# Patient Record
Sex: FEMALE | Race: WHITE | NOT HISPANIC OR LATINO
[De-identification: names, ages, dates, MRNs, and addresses within clinical notes are randomized per-mention and may not be internally consistent; named-entity substitution may affect disease eponyms.]

---

## 2018-10-02 ENCOUNTER — RESULT REVIEW (OUTPATIENT)
Age: 28
End: 2018-10-02

## 2019-12-06 ENCOUNTER — RESULT REVIEW (OUTPATIENT)
Age: 29
End: 2019-12-06

## 2019-12-19 ENCOUNTER — ASOB RESULT (OUTPATIENT)
Age: 29
End: 2019-12-19

## 2019-12-19 ENCOUNTER — APPOINTMENT (OUTPATIENT)
Dept: ANTEPARTUM | Facility: CLINIC | Age: 29
End: 2019-12-19
Payer: COMMERCIAL

## 2019-12-19 PROCEDURE — 76813 OB US NUCHAL MEAS 1 GEST: CPT

## 2019-12-19 PROCEDURE — 76801 OB US < 14 WKS SINGLE FETUS: CPT

## 2019-12-19 PROCEDURE — 36416 COLLJ CAPILLARY BLOOD SPEC: CPT

## 2020-02-12 PROBLEM — Z00.00 ENCOUNTER FOR PREVENTIVE HEALTH EXAMINATION: Status: ACTIVE | Noted: 2020-02-12

## 2020-02-13 ENCOUNTER — ASOB RESULT (OUTPATIENT)
Age: 30
End: 2020-02-13

## 2020-02-13 ENCOUNTER — APPOINTMENT (OUTPATIENT)
Dept: ANTEPARTUM | Facility: CLINIC | Age: 30
End: 2020-02-13
Payer: COMMERCIAL

## 2020-02-13 PROCEDURE — 76811 OB US DETAILED SNGL FETUS: CPT

## 2020-02-13 PROCEDURE — 76817 TRANSVAGINAL US OBSTETRIC: CPT | Mod: 59

## 2020-02-24 ENCOUNTER — ASOB RESULT (OUTPATIENT)
Age: 30
End: 2020-02-24

## 2020-02-24 ENCOUNTER — APPOINTMENT (OUTPATIENT)
Dept: ANTEPARTUM | Facility: CLINIC | Age: 30
End: 2020-02-24
Payer: COMMERCIAL

## 2020-02-24 PROCEDURE — 76816 OB US FOLLOW-UP PER FETUS: CPT

## 2020-04-26 ENCOUNTER — MESSAGE (OUTPATIENT)
Age: 30
End: 2020-04-26

## 2020-05-14 ENCOUNTER — APPOINTMENT (OUTPATIENT)
Dept: ANTEPARTUM | Facility: CLINIC | Age: 30
End: 2020-05-14
Payer: COMMERCIAL

## 2020-05-14 ENCOUNTER — ASOB RESULT (OUTPATIENT)
Age: 30
End: 2020-05-14

## 2020-05-14 PROCEDURE — 76816 OB US FOLLOW-UP PER FETUS: CPT

## 2020-05-14 PROCEDURE — 76819 FETAL BIOPHYS PROFIL W/O NST: CPT

## 2020-06-16 ENCOUNTER — ASOB RESULT (OUTPATIENT)
Age: 30
End: 2020-06-16

## 2020-06-16 ENCOUNTER — APPOINTMENT (OUTPATIENT)
Dept: ANTEPARTUM | Facility: CLINIC | Age: 30
End: 2020-06-16
Payer: COMMERCIAL

## 2020-06-16 PROCEDURE — 76819 FETAL BIOPHYS PROFIL W/O NST: CPT

## 2020-06-16 PROCEDURE — 76816 OB US FOLLOW-UP PER FETUS: CPT

## 2020-06-29 ENCOUNTER — APPOINTMENT (OUTPATIENT)
Dept: ANTEPARTUM | Facility: CLINIC | Age: 30
End: 2020-06-29
Payer: COMMERCIAL

## 2020-06-29 ENCOUNTER — ASOB RESULT (OUTPATIENT)
Age: 30
End: 2020-06-29

## 2020-06-29 PROCEDURE — 76816 OB US FOLLOW-UP PER FETUS: CPT

## 2020-06-29 PROCEDURE — 76819 FETAL BIOPHYS PROFIL W/O NST: CPT

## 2020-07-04 ENCOUNTER — TRANSCRIPTION ENCOUNTER (OUTPATIENT)
Age: 30
End: 2020-07-04

## 2020-07-05 ENCOUNTER — INPATIENT (INPATIENT)
Facility: HOSPITAL | Age: 30
LOS: 3 days | Discharge: HOME | End: 2020-07-09
Attending: OBSTETRICS & GYNECOLOGY | Admitting: OBSTETRICS & GYNECOLOGY

## 2020-07-05 VITALS
SYSTOLIC BLOOD PRESSURE: 128 MMHG | DIASTOLIC BLOOD PRESSURE: 76 MMHG | TEMPERATURE: 98 F | HEART RATE: 79 BPM | RESPIRATION RATE: 16 BRPM

## 2020-07-05 DIAGNOSIS — O26.899 OTHER SPECIFIED PREGNANCY RELATED CONDITIONS, UNSPECIFIED TRIMESTER: ICD-10-CM

## 2020-07-05 DIAGNOSIS — Z3A.00 WEEKS OF GESTATION OF PREGNANCY NOT SPECIFIED: ICD-10-CM

## 2020-07-05 LAB
BASOPHILS # BLD AUTO: 0.01 K/UL — SIGNIFICANT CHANGE UP (ref 0–0.2)
BASOPHILS NFR BLD AUTO: 0.1 % — SIGNIFICANT CHANGE UP (ref 0–2)
BLD GP AB SCN SERPL QL: NEGATIVE — SIGNIFICANT CHANGE UP
EOSINOPHIL # BLD AUTO: 0.01 K/UL — SIGNIFICANT CHANGE UP (ref 0–0.5)
EOSINOPHIL NFR BLD AUTO: 0.1 % — SIGNIFICANT CHANGE UP (ref 0–6)
HCT VFR BLD CALC: 34.5 % — SIGNIFICANT CHANGE UP (ref 34.5–45)
HGB BLD-MCNC: 11.2 G/DL — LOW (ref 11.5–15.5)
IMM GRANULOCYTES NFR BLD AUTO: 0.3 % — SIGNIFICANT CHANGE UP (ref 0–1.5)
LYMPHOCYTES # BLD AUTO: 1.16 K/UL — SIGNIFICANT CHANGE UP (ref 1–3.3)
LYMPHOCYTES # BLD AUTO: 15.8 % — SIGNIFICANT CHANGE UP (ref 13–44)
MCHC RBC-ENTMCNC: 30.9 PG — SIGNIFICANT CHANGE UP (ref 27–34)
MCHC RBC-ENTMCNC: 32.5 % — SIGNIFICANT CHANGE UP (ref 32–36)
MCV RBC AUTO: 95 FL — SIGNIFICANT CHANGE UP (ref 80–100)
MONOCYTES # BLD AUTO: 0.66 K/UL — SIGNIFICANT CHANGE UP (ref 0–0.9)
MONOCYTES NFR BLD AUTO: 9 % — SIGNIFICANT CHANGE UP (ref 2–14)
NEUTROPHILS # BLD AUTO: 5.47 K/UL — SIGNIFICANT CHANGE UP (ref 1.8–7.4)
NEUTROPHILS NFR BLD AUTO: 74.7 % — SIGNIFICANT CHANGE UP (ref 43–77)
NRBC # FLD: 0 K/UL — SIGNIFICANT CHANGE UP (ref 0–0)
PLATELET # BLD AUTO: 184 K/UL — SIGNIFICANT CHANGE UP (ref 150–400)
PMV BLD: 10.7 FL — SIGNIFICANT CHANGE UP (ref 7–13)
RBC # BLD: 3.63 M/UL — LOW (ref 3.8–5.2)
RBC # FLD: 12.5 % — SIGNIFICANT CHANGE UP (ref 10.3–14.5)
RH IG SCN BLD-IMP: POSITIVE — SIGNIFICANT CHANGE UP
RH IG SCN BLD-IMP: POSITIVE — SIGNIFICANT CHANGE UP
SARS-COV-2 RNA SPEC QL NAA+PROBE: SIGNIFICANT CHANGE UP
WBC # BLD: 7.33 K/UL — SIGNIFICANT CHANGE UP (ref 3.8–10.5)
WBC # FLD AUTO: 7.33 K/UL — SIGNIFICANT CHANGE UP (ref 3.8–10.5)

## 2020-07-05 RX ORDER — SODIUM CHLORIDE 9 MG/ML
1000 INJECTION, SOLUTION INTRAVENOUS
Refills: 0 | Status: DISCONTINUED | OUTPATIENT
Start: 2020-07-05 | End: 2020-07-05

## 2020-07-05 RX ORDER — OXYTOCIN 10 UNIT/ML
333.33 VIAL (ML) INJECTION
Qty: 20 | Refills: 0 | Status: DISCONTINUED | OUTPATIENT
Start: 2020-07-05 | End: 2020-07-05

## 2020-07-05 RX ORDER — ONDANSETRON 8 MG/1
4 TABLET, FILM COATED ORAL EVERY 6 HOURS
Refills: 0 | Status: DISCONTINUED | OUTPATIENT
Start: 2020-07-05 | End: 2020-07-06

## 2020-07-05 RX ORDER — SIMETHICONE 80 MG/1
80 TABLET, CHEWABLE ORAL EVERY 4 HOURS
Refills: 0 | Status: DISCONTINUED | OUTPATIENT
Start: 2020-07-05 | End: 2020-07-09

## 2020-07-05 RX ORDER — FAMOTIDINE 10 MG/ML
20 INJECTION INTRAVENOUS ONCE
Refills: 0 | Status: DISCONTINUED | OUTPATIENT
Start: 2020-07-05 | End: 2020-07-05

## 2020-07-05 RX ORDER — METOCLOPRAMIDE HCL 10 MG
10 TABLET ORAL ONCE
Refills: 0 | Status: COMPLETED | OUTPATIENT
Start: 2020-07-05 | End: 2020-07-05

## 2020-07-05 RX ORDER — SODIUM CHLORIDE 9 MG/ML
1000 INJECTION, SOLUTION INTRAVENOUS ONCE
Refills: 0 | Status: DISCONTINUED | OUTPATIENT
Start: 2020-07-05 | End: 2020-07-05

## 2020-07-05 RX ORDER — DIPHENHYDRAMINE HCL 50 MG
25 CAPSULE ORAL EVERY 6 HOURS
Refills: 0 | Status: DISCONTINUED | OUTPATIENT
Start: 2020-07-05 | End: 2020-07-09

## 2020-07-05 RX ORDER — CEFAZOLIN SODIUM 1 G
2000 VIAL (EA) INJECTION ONCE
Refills: 0 | Status: COMPLETED | OUTPATIENT
Start: 2020-07-05 | End: 2020-07-05

## 2020-07-05 RX ORDER — OXYCODONE HYDROCHLORIDE 5 MG/1
5 TABLET ORAL
Refills: 0 | Status: DISCONTINUED | OUTPATIENT
Start: 2020-07-05 | End: 2020-07-09

## 2020-07-05 RX ORDER — LANOLIN
1 OINTMENT (GRAM) TOPICAL EVERY 6 HOURS
Refills: 0 | Status: DISCONTINUED | OUTPATIENT
Start: 2020-07-05 | End: 2020-07-09

## 2020-07-05 RX ORDER — HEPARIN SODIUM 5000 [USP'U]/ML
5000 INJECTION INTRAVENOUS; SUBCUTANEOUS EVERY 12 HOURS
Refills: 0 | Status: DISCONTINUED | OUTPATIENT
Start: 2020-07-05 | End: 2020-07-09

## 2020-07-05 RX ORDER — IBUPROFEN 200 MG
600 TABLET ORAL EVERY 6 HOURS
Refills: 0 | Status: COMPLETED | OUTPATIENT
Start: 2020-07-05 | End: 2021-06-03

## 2020-07-05 RX ORDER — OXYCODONE HYDROCHLORIDE 5 MG/1
5 TABLET ORAL
Refills: 0 | Status: DISCONTINUED | OUTPATIENT
Start: 2020-07-05 | End: 2020-07-06

## 2020-07-05 RX ORDER — FAMOTIDINE 10 MG/ML
20 INJECTION INTRAVENOUS ONCE
Refills: 0 | Status: COMPLETED | OUTPATIENT
Start: 2020-07-05 | End: 2020-07-05

## 2020-07-05 RX ORDER — NALOXONE HYDROCHLORIDE 4 MG/.1ML
0.1 SPRAY NASAL
Refills: 0 | Status: DISCONTINUED | OUTPATIENT
Start: 2020-07-05 | End: 2020-07-06

## 2020-07-05 RX ORDER — CITRIC ACID/SODIUM CITRATE 300-500 MG
30 SOLUTION, ORAL ORAL ONCE
Refills: 0 | Status: COMPLETED | OUTPATIENT
Start: 2020-07-05 | End: 2020-07-05

## 2020-07-05 RX ORDER — OXYCODONE HYDROCHLORIDE 5 MG/1
10 TABLET ORAL
Refills: 0 | Status: DISCONTINUED | OUTPATIENT
Start: 2020-07-05 | End: 2020-07-06

## 2020-07-05 RX ORDER — OXYCODONE HYDROCHLORIDE 5 MG/1
5 TABLET ORAL ONCE
Refills: 0 | Status: DISCONTINUED | OUTPATIENT
Start: 2020-07-05 | End: 2020-07-09

## 2020-07-05 RX ORDER — ACETAMINOPHEN 500 MG
975 TABLET ORAL
Refills: 0 | Status: DISCONTINUED | OUTPATIENT
Start: 2020-07-05 | End: 2020-07-09

## 2020-07-05 RX ORDER — TETANUS TOXOID, REDUCED DIPHTHERIA TOXOID AND ACELLULAR PERTUSSIS VACCINE, ADSORBED 5; 2.5; 8; 8; 2.5 [IU]/.5ML; [IU]/.5ML; UG/.5ML; UG/.5ML; UG/.5ML
0.5 SUSPENSION INTRAMUSCULAR ONCE
Refills: 0 | Status: DISCONTINUED | OUTPATIENT
Start: 2020-07-05 | End: 2020-07-09

## 2020-07-05 RX ORDER — FERROUS SULFATE 325(65) MG
325 TABLET ORAL DAILY
Refills: 0 | Status: DISCONTINUED | OUTPATIENT
Start: 2020-07-05 | End: 2020-07-09

## 2020-07-05 RX ORDER — HYDROMORPHONE HYDROCHLORIDE 2 MG/ML
0.5 INJECTION INTRAMUSCULAR; INTRAVENOUS; SUBCUTANEOUS
Refills: 0 | Status: DISCONTINUED | OUTPATIENT
Start: 2020-07-05 | End: 2020-07-06

## 2020-07-05 RX ORDER — KETOROLAC TROMETHAMINE 30 MG/ML
30 SYRINGE (ML) INJECTION EVERY 6 HOURS
Refills: 0 | Status: DISCONTINUED | OUTPATIENT
Start: 2020-07-05 | End: 2020-07-06

## 2020-07-05 RX ORDER — MAGNESIUM HYDROXIDE 400 MG/1
30 TABLET, CHEWABLE ORAL
Refills: 0 | Status: DISCONTINUED | OUTPATIENT
Start: 2020-07-05 | End: 2020-07-09

## 2020-07-05 RX ORDER — METOCLOPRAMIDE HCL 10 MG
10 TABLET ORAL ONCE
Refills: 0 | Status: DISCONTINUED | OUTPATIENT
Start: 2020-07-05 | End: 2020-07-05

## 2020-07-05 RX ORDER — METOCLOPRAMIDE HCL 10 MG
5 TABLET ORAL EVERY 6 HOURS
Refills: 0 | Status: COMPLETED | OUTPATIENT
Start: 2020-07-05 | End: 2020-07-06

## 2020-07-05 RX ORDER — CITRIC ACID/SODIUM CITRATE 300-500 MG
30 SOLUTION, ORAL ORAL ONCE
Refills: 0 | Status: DISCONTINUED | OUTPATIENT
Start: 2020-07-05 | End: 2020-07-05

## 2020-07-05 RX ADMIN — SODIUM CHLORIDE 2000 MILLILITER(S): 9 INJECTION, SOLUTION INTRAVENOUS at 11:30

## 2020-07-05 RX ADMIN — Medication 30 MILLIGRAM(S): at 23:05

## 2020-07-05 RX ADMIN — Medication 5 MILLIGRAM(S): at 23:05

## 2020-07-05 RX ADMIN — Medication 5 MILLIGRAM(S): at 16:43

## 2020-07-05 RX ADMIN — Medication 100 MILLIGRAM(S): at 16:42

## 2020-07-05 RX ADMIN — Medication 1000 MILLIUNIT(S)/MIN: at 16:42

## 2020-07-05 RX ADMIN — Medication 30 MILLIGRAM(S): at 17:07

## 2020-07-05 RX ADMIN — HEPARIN SODIUM 5000 UNIT(S): 5000 INJECTION INTRAVENOUS; SUBCUTANEOUS at 23:16

## 2020-07-05 RX ADMIN — Medication 975 MILLIGRAM(S): at 21:33

## 2020-07-05 RX ADMIN — Medication 30 MILLILITER(S): at 14:11

## 2020-07-05 RX ADMIN — SODIUM CHLORIDE 75 MILLILITER(S): 9 INJECTION, SOLUTION INTRAVENOUS at 19:13

## 2020-07-05 RX ADMIN — FAMOTIDINE 20 MILLIGRAM(S): 10 INJECTION INTRAVENOUS at 14:11

## 2020-07-05 RX ADMIN — Medication 10 MILLIGRAM(S): at 14:11

## 2020-07-05 RX ADMIN — Medication 25 MILLIGRAM(S): at 21:33

## 2020-07-05 NOTE — OB PROVIDER DELIVERY SUMMARY - NSPROVIDERDELIVERYNOTE_OBGYN_ALL_OB_FT
Male infant Apgars 8,9    UOP: 400  IVF: 2200    Grossly normal uterus, bilateral tubes and ovaries. Surgicel used on the hysterotomy. Male infant Apgars 8,9    UOP: 400  IVF: 2200    Grossly normal uterus, bilateral tubes and ovaries. Surgicel used on the hysterotomy. A small, anterofundal sub serosal fibroid was lysed using Bovie cautery

## 2020-07-05 NOTE — OB PROVIDER H&P - ATTENDING COMMENTS
Patient seen in holding area. Again, Risks and benefits of an elective  section Vs vaginal delivery in the setting of an LGA fetus and a borderline pelvis discussed and informed consents obtained.

## 2020-07-05 NOTE — OB PROVIDER TRIAGE NOTE - NSOBPROVIDERNOTE_OBGYN_ALL_OB_FT
29 year old female presented in labor  EFW 4300g LGA for Primary CS    case d/w DR Greenfield    labs sent   Covid 19 testing done      Bobby BLACKWOOD

## 2020-07-05 NOTE — OB PROVIDER TRIAGE NOTE - NSHPPHYSICALEXAM_GEN_ALL_CORE
pt seen and examined    ICU Vital Signs Last 24 Hrs  T(C): 36.6 (05 Jul 2020 11:15), Max: 36.9 (05 Jul 2020 10:20)  T(F): 97.9 (05 Jul 2020 11:15), Max: 98.4 (05 Jul 2020 10:20)  HR: 79 (05 Jul 2020 11:43) (70 - 87)  BP: 132/85 (05 Jul 2020 11:34) (122/80 - 132/85)  BP(mean): --  ABP: --  ABP(mean): --  RR: 18 (05 Jul 2020 11:15) (16 - 18)  SpO2: 100% (05 Jul 2020 11:38) (98% - 100%)  pt alert in nad   lungs c;ear    heart s1 s2  abd soft  gravid   ve l/c/p/-3   placed on EFM     scan vtx  efw  LGA  4300g

## 2020-07-05 NOTE — OB PROVIDER H&P - ASSESSMENT
29 year old female presented in labor  EFW 4300g LGA for Primary CS    case d/w DR Greenfield    labs sent   Covid 19 testing done      Bobby BLACKWOOD 29 year old female presented in labor  EFW 4300g LGA for Primary CS    case d/w DR Street    labs sent   Covid 19 testing done      Bobby BLACKWOOD

## 2020-07-05 NOTE — OB NEONATOLOGY/PEDIATRICIAN DELIVERY SUMMARY - NSPEDSNEONOTESA_OBGYN_ALL_OB_FT
Baby boy born at 40.2 wks via CS to a 28 y/o  B+ blood type mother. Maternal history of positive HSV1 IgG on prenatal labs, no history or medications disclosed. Prenatal history of macrosomia. PNL nr/immune/-, GBS - on . ROM at TOD clear fluids. Nuchal x 1. Baby emerged vigorous, crying, was w/d/s/s with APGARS of 8/9. Mom would like to breast feed, requests Hep B and consents circ. EOS 0.01 Baby boy born at 40.2 wks via CS to a 30 y/o  B+ blood type mother. Maternal history of positive HSV1 IgG on prenatal labs, no history or medications disclosed. Prenatal history of macrosomia. PNL nr/immune/-, GBS - on . ROM at TOD clear fluids. Nuchal x 2. Baby emerged vigorous, crying, was w/d/s/s with APGARS of 8/9. Mom would like to breast feed, requests Hep B and consents circ. EOS 0.01

## 2020-07-05 NOTE — OB PROVIDER TRIAGE NOTE - HISTORY OF PRESENT ILLNESS
29 year old female P0 at40.2 weeks 29 year old female P0 at 40.2 weeks who presents with contractions every 3-5 minutes this am and sted EFW LGA 4300g and scheduled for PCS on 7/62020    denied any GDM    denied any rom lof vb  feels GFM   denied any other ap issues denied any fetal issues     med hx denied  surghx denied  OB hx P0   GYn  hx denied  NKDA

## 2020-07-05 NOTE — OB RN DELIVERY SUMMARY - NS_SEPSISRSKCALC_OBGYN_ALL_OB_FT
EOS calculated successfully. EOS Risk Factor: 0.5/1000 live births ([facility] national incidence); GA=40w2d; Temp=98.4; ROM=0.017; GBS='Negative'; Antibiotics='No antibiotics or any antibiotics < 2 hrs prior to birth'

## 2020-07-05 NOTE — OB PROVIDER H&P - HISTORY OF PRESENT ILLNESS
29 year old female P0 at 40.2 weeks who presents with contractions every 3-5 minutes this am and sted EFW LGA 4300g and scheduled for PCS on 7/62020    denied any GDM    denied any rom lof vb  feels GFM   denied any other ap issues denied any fetal issues     med hx denied  surghx denied  OB hx P0   GYn  hx denied  NKDA

## 2020-07-06 LAB
APPEARANCE UR: SIGNIFICANT CHANGE UP
BACTERIA # UR AUTO: SIGNIFICANT CHANGE UP
BASOPHILS # BLD AUTO: 0.01 K/UL — SIGNIFICANT CHANGE UP (ref 0–0.2)
BASOPHILS NFR BLD AUTO: 0.1 % — SIGNIFICANT CHANGE UP (ref 0–2)
BILIRUB UR-MCNC: NEGATIVE — SIGNIFICANT CHANGE UP
BLOOD UR QL VISUAL: HIGH
COLOR SPEC: SIGNIFICANT CHANGE UP
CREAT SERPL-MCNC: 0.67 MG/DL — SIGNIFICANT CHANGE UP (ref 0.5–1.3)
EOSINOPHIL # BLD AUTO: 0.01 K/UL — SIGNIFICANT CHANGE UP (ref 0–0.5)
EOSINOPHIL NFR BLD AUTO: 0.1 % — SIGNIFICANT CHANGE UP (ref 0–6)
GLUCOSE UR-MCNC: NEGATIVE — SIGNIFICANT CHANGE UP
HCT VFR BLD CALC: 26.4 % — LOW (ref 34.5–45)
HCT VFR BLD CALC: 27.2 % — LOW (ref 34.5–45)
HGB BLD-MCNC: 8.7 G/DL — LOW (ref 11.5–15.5)
HGB BLD-MCNC: 9 G/DL — LOW (ref 11.5–15.5)
HYALINE CASTS # UR AUTO: SIGNIFICANT CHANGE UP
IMM GRANULOCYTES NFR BLD AUTO: 0.4 % — SIGNIFICANT CHANGE UP (ref 0–1.5)
KETONES UR-MCNC: NEGATIVE — SIGNIFICANT CHANGE UP
LACTATE SERPL-SCNC: 1.4 MMOL/L — SIGNIFICANT CHANGE UP (ref 0.5–2)
LACTATE SERPL-SCNC: 2.1 MMOL/L — HIGH (ref 0.5–2)
LEUKOCYTE ESTERASE UR-ACNC: SIGNIFICANT CHANGE UP
LYMPHOCYTES # BLD AUTO: 0.76 K/UL — LOW (ref 1–3.3)
LYMPHOCYTES # BLD AUTO: 9.3 % — LOW (ref 13–44)
MCHC RBC-ENTMCNC: 31.4 PG — SIGNIFICANT CHANGE UP (ref 27–34)
MCHC RBC-ENTMCNC: 31.8 PG — SIGNIFICANT CHANGE UP (ref 27–34)
MCHC RBC-ENTMCNC: 33 % — SIGNIFICANT CHANGE UP (ref 32–36)
MCHC RBC-ENTMCNC: 33.1 % — SIGNIFICANT CHANGE UP (ref 32–36)
MCV RBC AUTO: 95.3 FL — SIGNIFICANT CHANGE UP (ref 80–100)
MCV RBC AUTO: 96.1 FL — SIGNIFICANT CHANGE UP (ref 80–100)
MONOCYTES # BLD AUTO: 0.69 K/UL — SIGNIFICANT CHANGE UP (ref 0–0.9)
MONOCYTES NFR BLD AUTO: 8.5 % — SIGNIFICANT CHANGE UP (ref 2–14)
NEUTROPHILS # BLD AUTO: 6.65 K/UL — SIGNIFICANT CHANGE UP (ref 1.8–7.4)
NEUTROPHILS NFR BLD AUTO: 81.6 % — HIGH (ref 43–77)
NITRITE UR-MCNC: NEGATIVE — SIGNIFICANT CHANGE UP
NRBC # FLD: 0 K/UL — SIGNIFICANT CHANGE UP (ref 0–0)
NRBC # FLD: 0 K/UL — SIGNIFICANT CHANGE UP (ref 0–0)
PH UR: 7.5 — SIGNIFICANT CHANGE UP (ref 5–8)
PLATELET # BLD AUTO: 153 K/UL — SIGNIFICANT CHANGE UP (ref 150–400)
PLATELET # BLD AUTO: 166 K/UL — SIGNIFICANT CHANGE UP (ref 150–400)
PMV BLD: 10.6 FL — SIGNIFICANT CHANGE UP (ref 7–13)
PMV BLD: 10.8 FL — SIGNIFICANT CHANGE UP (ref 7–13)
PROT UR-MCNC: 100 — HIGH
RBC # BLD: 2.77 M/UL — LOW (ref 3.8–5.2)
RBC # BLD: 2.83 M/UL — LOW (ref 3.8–5.2)
RBC # FLD: 12.5 % — SIGNIFICANT CHANGE UP (ref 10.3–14.5)
RBC # FLD: 12.7 % — SIGNIFICANT CHANGE UP (ref 10.3–14.5)
RBC CASTS # UR COMP ASSIST: >50 — HIGH (ref 0–?)
SP GR SPEC: 1.01 — SIGNIFICANT CHANGE UP (ref 1–1.04)
SQUAMOUS # UR AUTO: SIGNIFICANT CHANGE UP
T PALLIDUM AB TITR SER: NEGATIVE — SIGNIFICANT CHANGE UP
UROBILINOGEN FLD QL: NORMAL — SIGNIFICANT CHANGE UP
WBC # BLD: 8.15 K/UL — SIGNIFICANT CHANGE UP (ref 3.8–10.5)
WBC # BLD: 9.03 K/UL — SIGNIFICANT CHANGE UP (ref 3.8–10.5)
WBC # FLD AUTO: 8.15 K/UL — SIGNIFICANT CHANGE UP (ref 3.8–10.5)
WBC # FLD AUTO: 9.03 K/UL — SIGNIFICANT CHANGE UP (ref 3.8–10.5)
WBC UR QL: HIGH (ref 0–?)

## 2020-07-06 RX ORDER — IBUPROFEN 200 MG
600 TABLET ORAL EVERY 6 HOURS
Refills: 0 | Status: DISCONTINUED | OUTPATIENT
Start: 2020-07-06 | End: 2020-07-09

## 2020-07-06 RX ORDER — ERTAPENEM SODIUM 1 G/1
INJECTION, POWDER, LYOPHILIZED, FOR SOLUTION INTRAMUSCULAR; INTRAVENOUS
Refills: 0 | Status: DISCONTINUED | OUTPATIENT
Start: 2020-07-06 | End: 2020-07-06

## 2020-07-06 RX ORDER — SODIUM CHLORIDE 9 MG/ML
1000 INJECTION, SOLUTION INTRAVENOUS
Refills: 0 | Status: DISCONTINUED | OUTPATIENT
Start: 2020-07-06 | End: 2020-07-07

## 2020-07-06 RX ORDER — ACETAMINOPHEN 500 MG
975 TABLET ORAL EVERY 6 HOURS
Refills: 0 | Status: DISCONTINUED | OUTPATIENT
Start: 2020-07-06 | End: 2020-07-06

## 2020-07-06 RX ORDER — SODIUM CHLORIDE 9 MG/ML
1000 INJECTION, SOLUTION INTRAVENOUS ONCE
Refills: 0 | Status: COMPLETED | OUTPATIENT
Start: 2020-07-06 | End: 2020-07-06

## 2020-07-06 RX ORDER — BETHANECHOL CHLORIDE 25 MG
5 TABLET ORAL THREE TIMES A DAY
Refills: 0 | Status: COMPLETED | OUTPATIENT
Start: 2020-07-06 | End: 2020-07-07

## 2020-07-06 RX ORDER — ERTAPENEM SODIUM 1 G/1
1000 INJECTION, POWDER, LYOPHILIZED, FOR SOLUTION INTRAMUSCULAR; INTRAVENOUS ONCE
Refills: 0 | Status: COMPLETED | OUTPATIENT
Start: 2020-07-06 | End: 2020-07-06

## 2020-07-06 RX ADMIN — SODIUM CHLORIDE 1000 MILLILITER(S): 9 INJECTION, SOLUTION INTRAVENOUS at 18:30

## 2020-07-06 RX ADMIN — Medication 5 MILLIGRAM(S): at 05:17

## 2020-07-06 RX ADMIN — Medication 30 MILLIGRAM(S): at 05:16

## 2020-07-06 RX ADMIN — Medication 30 MILLIGRAM(S): at 12:19

## 2020-07-06 RX ADMIN — Medication 975 MILLIGRAM(S): at 18:00

## 2020-07-06 RX ADMIN — Medication 5 MILLIGRAM(S): at 12:20

## 2020-07-06 RX ADMIN — Medication 5 MILLIGRAM(S): at 21:30

## 2020-07-06 RX ADMIN — HEPARIN SODIUM 5000 UNIT(S): 5000 INJECTION INTRAVENOUS; SUBCUTANEOUS at 11:00

## 2020-07-06 RX ADMIN — Medication 975 MILLIGRAM(S): at 03:00

## 2020-07-06 RX ADMIN — Medication 25 MILLIGRAM(S): at 05:26

## 2020-07-06 RX ADMIN — ERTAPENEM SODIUM 120 MILLIGRAM(S): 1 INJECTION, POWDER, LYOPHILIZED, FOR SOLUTION INTRAMUSCULAR; INTRAVENOUS at 19:00

## 2020-07-06 RX ADMIN — HEPARIN SODIUM 5000 UNIT(S): 5000 INJECTION INTRAVENOUS; SUBCUTANEOUS at 22:44

## 2020-07-06 RX ADMIN — Medication 5 MILLIGRAM(S): at 13:38

## 2020-07-06 NOTE — PROGRESS NOTE ADULT - SUBJECTIVE AND OBJECTIVE BOX
Patient seen and examined at bedside, no acute overnight events. Patients reports diffuse pruritis, pain well controlled. Patient is ambulating and tolerating regular diet. Has not yet passed flatus. Voiding spontaneously. No other complaints at this time.    Vital Signs Last 24 Hours  T(C): 37.2 (07-06-20 @ 05:55), Max: 37.2 (07-06-20 @ 05:55)  HR: 100 (07-06-20 @ 05:55) (60 - 100)  BP: 106/56 (07-06-20 @ 05:55) (90/69 - 136/85)  RR: 18 (07-06-20 @ 05:55) (11 - 22)  SpO2: 98% (07-06-20 @ 05:55) (98% - 100%)    I&O's Summary    05 Jul 2020 07:01  -  06 Jul 2020 07:00  --------------------------------------------------------  IN: 4325 mL / OUT: 2146 mL / NET: 2179 mL        Physical Exam:  General: NAD  Abdomen: Soft, non-tender, non-distended, fundus firm  Incision: Pfannenstiel incision CDI, subcuticular suture closure  Pelvic: Lochia wnl  Skin: No hives, no rash    Labs:    Blood Type: B Positive  Antibody Screen: Negative               9.0    8.15  )-----------( 153      ( 07-06 @ 06:04 )             27.2                11.2   7.33  )-----------( 184      ( 07-05 @ 11:15 )             34.5         MEDICATIONS  (STANDING):  acetaminophen   Tablet .. 975 milliGRAM(s) Oral <User Schedule>  diphtheria/tetanus/pertussis (acellular) Vaccine (ADAcel) 0.5 milliLiter(s) IntraMuscular once  ferrous    sulfate 325 milliGRAM(s) Oral daily  heparin   Injectable 5000 Unit(s) SubCutaneous every 12 hours  ibuprofen  Tablet. 600 milliGRAM(s) Oral every 6 hours  ketorolac   Injectable 30 milliGRAM(s) IV Push every 6 hours  metoclopramide Injectable 5 milliGRAM(s) IV Push every 6 hours  prenatal multivitamin 1 Tablet(s) Oral daily    MEDICATIONS  (PRN):  diphenhydrAMINE 25 milliGRAM(s) Oral every 6 hours PRN Itching  HYDROmorphone  Injectable 0.5 milliGRAM(s) IV Push every 2 hours PRN Severe Pain (7 - 10)  lanolin Ointment 1 Application(s) Topical every 6 hours PRN Sore Nipples  magnesium hydroxide Suspension 30 milliLiter(s) Oral two times a day PRN Constipation  naloxone Injectable 0.1 milliGRAM(s) IV Push every 3 minutes PRN For ANY of the following changes in patient status:  A. RR LESS THAN 10 breaths per minute, B. Oxygen saturation LESS THAN 90%, C. Sedation score of 6  ondansetron Injectable 4 milliGRAM(s) IV Push every 6 hours PRN Nausea  oxyCODONE    IR 5 milliGRAM(s) Oral every 3 hours PRN Mild Pain (1 - 3)  oxyCODONE    IR 10 milliGRAM(s) Oral every 3 hours PRN Moderate Pain (4 - 6)  oxyCODONE    IR 5 milliGRAM(s) Oral every 3 hours PRN Moderate to Severe Pain (4-10)  oxyCODONE    IR 5 milliGRAM(s) Oral once PRN Moderate to Severe Pain (4-10)  simethicone 80 milliGRAM(s) Chew every 4 hours PRN Gas

## 2020-07-06 NOTE — PROGRESS NOTE ADULT - SUBJECTIVE AND OBJECTIVE BOX
ARLEEN CALDERON is a  29y  year old  Female    Event :   Notified by RN pt with Temp 102.6    Vital Signs Last 24 Hrs  T(C): 39.2 (06 Jul 2020 17:45), Max: 39.2 (06 Jul 2020 17:45)  T(F): 102.6 (06 Jul 2020 17:45), Max: 102.6 (06 Jul 2020 17:45)  HR: 110 (06 Jul 2020 17:45) (60 - 110)  BP: 135/84 (06 Jul 2020 17:45) (106/56 - 135/84)  BP(mean): 72 (05 Jul 2020 19:00) (72 - 74)  RR: 18 (06 Jul 2020 17:45) (18 - 20)  SpO2: 100% (06 Jul 2020 17:45) (98% - 100%)    I&O's Detail    05 Jul 2020 07:01  -  06 Jul 2020 07:00  --------------------------------------------------------  IN:    lactated ringers.: 375 mL    lactated ringers.: 500 mL    Other: 2200 mL    oxytocin Infusion: 500 mL    oxytocin Infusion: 750 mL  Total IN: 4325 mL    OUT:    Estimated Blood Loss: 406 mL    Indwelling Catheter - Urethral: 1740 mL  Total OUT: 2146 mL    Total NET: 2179 mL      06 Jul 2020 07:01  -  06 Jul 2020 18:08  --------------------------------------------------------  IN:  Total IN: 0 mL    OUT:    Indwelling Catheter - Urethral: 1200 mL    Voided: 700 mL  Total OUT: 1900 mL    Total NET: -1900 mL          Labs:                        9.0    8.15  )-----------( 153      ( 06 Jul 2020 06:04 )             27.2                         11.2   7.33  )-----------( 184      ( 05 Jul 2020 11:15 )             34.5             Fibrinogen:     Lactate:     MEDICATIONS  (STANDING):  acetaminophen   Tablet .. 975 milliGRAM(s) Oral <User Schedule>  bethanechol 5 milliGRAM(s) Oral three times a day  diphtheria/tetanus/pertussis (acellular) Vaccine (ADAcel) 0.5 milliLiter(s) IntraMuscular once  ertapenem  IVPB      ferrous    sulfate 325 milliGRAM(s) Oral daily  heparin   Injectable 5000 Unit(s) SubCutaneous every 12 hours  ibuprofen  Tablet. 600 milliGRAM(s) Oral every 6 hours  prenatal multivitamin 1 Tablet(s) Oral daily      Evaluation :  Pt is POD#2 s/p primary c/s for macrosomia notified by RN pt with temp 102.6  c/o of chills temp 102.6 Rectal,  pt denies Sick contact, URI symptoms SOB/ CP, sore throat, palpitations, Headache     ASSESSMENT:   Heart:  Regular Rhythm Rate  Lungs:  Equal clear bilaterally  Breasts: Soft [x  ]    Engorged [  ]  Abdomen: Soft [ x ]  Nontender [  ]  Nondistended [  ]  mildly Distended [ x ]                     Fundus firm [ x ]    Fundus boggy [   ]   Bowel sounds present [ x ]     Bowel sounds absent  [  ]   Incisional:  Subcutaneous closure  [  x ]    Staples  [   ]     Erythema [   ]   Clean/Dry/Intact [  x ]     Ecchymosis [   ]     Dermabond [ x  ] w/ steri-strips   Vagina: Lochia  Mod [ x ]      Scant [  ]  Heavy [  ]  Lower extremities: Edema[  ]    Noted bilaterally [  ]       Non-tender calves [ x ]      Negative Michael's sign [x ]    Positive pedal pulses [ x ]    MANAGEMENT PLAN/ FOLLOW-UP:    Discuss with Yenifer Nuñez for   Invanz 1gm IVPB  CBC, lactate,   UA/ Urine culture  Blood culture x2   tylenol PRN  will repeat CBC in AM   Will continue to monitor patient          -------------------------------------------------------------------------------------------------------------

## 2020-07-06 NOTE — PROVIDER CONTACT NOTE (CHANGE IN STATUS NOTIFICATION) - ASSESSMENT
as above, denies chest pain ,sob  , or other discomfort, patient noted with uncontrollable shaking and feeling cold

## 2020-07-06 NOTE — PROGRESS NOTE ADULT - ATTENDING COMMENTS
Was contacted at 11:30 AM today By NP that the patient has not voided >8 hours after foly removed. Patient was catheterized and >1000 ml of clear urine was obtained. Patient was Started on Bethanechol and succulency voided 700 ml spontaneously four hours later.    Will continue to observe for urinary retention  D/C Bethanechol after 3 doses.

## 2020-07-06 NOTE — CHART NOTE - NSCHARTNOTEFT_GEN_A_CORE
28y/o  POD#1 from pLTCS for LGA. No significant PMHx. H/H 9.0/27.2. Went to see pt bedside due to rectal T: 39.2 @1745 today (). Patient complaining of some abdominal pain but denies SOB, dysuria, chest pain. She is breastfeeding, voiding, taking PO, and has passed gas.     Vital Signs Last 24 Hrs  T(C): 39.2 (2020 17:45), Max: 39.2 (2020 17:45)  T(F): 102.6 (2020 17:45), Max: 102.6 (2020 17:45)  HR: 110 (2020 17:45) (68 - 110)  BP: 135/84 (2020 17:45) (106/56 - 135/84)  BP(mean): --  RR: 18 (2020 17:45) (18 - 18)  SpO2: 100% (2020 17:45) (98% - 100%)    Repeat rectal temp 30 minutes after tylenol: 38.8    NAD, sweating   Lung: Clear bilaterally to auscultation  Heart: tachycardic 120s  Abdomen: Fundal tenderness, fundus firm. Abdomen soft. No rebound, no guarding.   Incision: C/D/I. Steri strips in place. No drainage.   Pad: Minimal spotting. Appropriate lochia.   Extremities: Mild symmetric edema. SCDs in place.       28y/o  POD#1 from pLTCS for LGA w/ fever of Tmax 39.2 likely due to endometritis based on clinical picture.       - Stat CBC, BMP, lactate, UCx, BCx  - 1L LR bolus   - Invanz 1gm IVB   - Cold packs   - Continue to monitor     d/w Dr. Street, safety Dr. Kim Keshawn aware. Seen w/ PGY-3 Karen Bolton, Pgy-1

## 2020-07-07 LAB
ANION GAP SERPL CALC-SCNC: 10 MMO/L — SIGNIFICANT CHANGE UP (ref 7–14)
BUN SERPL-MCNC: 5 MG/DL — LOW (ref 7–23)
CALCIUM SERPL-MCNC: 8.6 MG/DL — SIGNIFICANT CHANGE UP (ref 8.4–10.5)
CHLORIDE SERPL-SCNC: 110 MMOL/L — HIGH (ref 98–107)
CO2 SERPL-SCNC: 21 MMOL/L — LOW (ref 22–31)
CREAT SERPL-MCNC: 0.58 MG/DL — SIGNIFICANT CHANGE UP (ref 0.5–1.3)
CULTURE RESULTS: SIGNIFICANT CHANGE UP
GLUCOSE SERPL-MCNC: 79 MG/DL — SIGNIFICANT CHANGE UP (ref 70–99)
HCT VFR BLD CALC: 26.1 % — LOW (ref 34.5–45)
HGB BLD-MCNC: 8.4 G/DL — LOW (ref 11.5–15.5)
LACTATE SERPL-SCNC: 1.3 MMOL/L — SIGNIFICANT CHANGE UP (ref 0.5–2)
MCHC RBC-ENTMCNC: 31.3 PG — SIGNIFICANT CHANGE UP (ref 27–34)
MCHC RBC-ENTMCNC: 32.2 % — SIGNIFICANT CHANGE UP (ref 32–36)
MCV RBC AUTO: 97.4 FL — SIGNIFICANT CHANGE UP (ref 80–100)
NRBC # FLD: 0 K/UL — SIGNIFICANT CHANGE UP (ref 0–0)
PLATELET # BLD AUTO: 144 K/UL — LOW (ref 150–400)
PMV BLD: 10.3 FL — SIGNIFICANT CHANGE UP (ref 7–13)
POTASSIUM SERPL-MCNC: 3.7 MMOL/L — SIGNIFICANT CHANGE UP (ref 3.5–5.3)
POTASSIUM SERPL-SCNC: 3.7 MMOL/L — SIGNIFICANT CHANGE UP (ref 3.5–5.3)
RBC # BLD: 2.68 M/UL — LOW (ref 3.8–5.2)
RBC # FLD: 12.9 % — SIGNIFICANT CHANGE UP (ref 10.3–14.5)
SODIUM SERPL-SCNC: 141 MMOL/L — SIGNIFICANT CHANGE UP (ref 135–145)
SPECIMEN SOURCE: SIGNIFICANT CHANGE UP
WBC # BLD: 7.77 K/UL — SIGNIFICANT CHANGE UP (ref 3.8–10.5)
WBC # FLD AUTO: 7.77 K/UL — SIGNIFICANT CHANGE UP (ref 3.8–10.5)

## 2020-07-07 RX ORDER — ERTAPENEM SODIUM 1 G/1
1000 INJECTION, POWDER, LYOPHILIZED, FOR SOLUTION INTRAMUSCULAR; INTRAVENOUS EVERY 24 HOURS
Refills: 0 | Status: COMPLETED | OUTPATIENT
Start: 2020-07-07 | End: 2020-07-07

## 2020-07-07 RX ADMIN — Medication 325 MILLIGRAM(S): at 07:25

## 2020-07-07 RX ADMIN — Medication 600 MILLIGRAM(S): at 17:12

## 2020-07-07 RX ADMIN — Medication 5 MILLIGRAM(S): at 06:35

## 2020-07-07 RX ADMIN — Medication 975 MILLIGRAM(S): at 02:37

## 2020-07-07 RX ADMIN — HEPARIN SODIUM 5000 UNIT(S): 5000 INJECTION INTRAVENOUS; SUBCUTANEOUS at 11:00

## 2020-07-07 RX ADMIN — Medication 975 MILLIGRAM(S): at 22:30

## 2020-07-07 RX ADMIN — Medication 975 MILLIGRAM(S): at 07:24

## 2020-07-07 RX ADMIN — HEPARIN SODIUM 5000 UNIT(S): 5000 INJECTION INTRAVENOUS; SUBCUTANEOUS at 22:28

## 2020-07-07 RX ADMIN — Medication 1 TABLET(S): at 07:25

## 2020-07-07 RX ADMIN — ERTAPENEM SODIUM 120 MILLIGRAM(S): 1 INJECTION, POWDER, LYOPHILIZED, FOR SOLUTION INTRAMUSCULAR; INTRAVENOUS at 17:12

## 2020-07-07 RX ADMIN — Medication 975 MILLIGRAM(S): at 13:56

## 2020-07-07 NOTE — PROGRESS NOTE ADULT - SUBJECTIVE AND OBJECTIVE BOX
Patient seen and examined at bedside. Reports rigors last night with TMax of 39.2@17:45 last night. Continues to reports lower abdominal pain that is partially relieved by PO pain medication. Patient required assistance to ambulate to the bathroom. States that she does not have a large appetite but is able to tolerate PO, states that she is passing flatus and now voiding spontaneously.     Vital Signs Last 24 Hours  T(C): 36.6 (07-07-20 @ 05:24), Max: 39.2 (07-06-20 @ 17:45)  HR: 68 (07-07-20 @ 05:24) (68 - 114)  BP: 112/73 (07-07-20 @ 05:24) (103/59 - 135/84)  RR: 18 (07-07-20 @ 05:24) (16 - 18)  SpO2: 100% (07-07-20 @ 05:24) (99% - 100%)    I&O's Summary    06 Jul 2020 07:01  -  07 Jul 2020 07:00  --------------------------------------------------------  IN: 2550 mL / OUT: 2900 mL / NET: -350 mL        Physical Exam:  General: NAD  Abdomen: Soft, non-distended, with fundal tenderness  Incision: Pfannenstiel incision CDI, subcuticular suture closure  Pelvic: Lochia without foul odor.     Labs:    Blood Type: B Positive  Antibody Screen: Negative  RPR: Negative               8.4    7.77  )-----------( 144      ( 07-07 @ 06:00 )             26.1                8.7    9.03  )-----------( 166      ( 07-06 @ 18:50 )             26.4                9.0    8.15  )-----------( 153      ( 07-06 @ 06:04 )             27.2         MEDICATIONS  (STANDING):  acetaminophen   Tablet .. 975 milliGRAM(s) Oral <User Schedule>  diphtheria/tetanus/pertussis (acellular) Vaccine (ADAcel) 0.5 milliLiter(s) IntraMuscular once  ferrous    sulfate 325 milliGRAM(s) Oral daily  heparin   Injectable 5000 Unit(s) SubCutaneous every 12 hours  ibuprofen  Tablet. 600 milliGRAM(s) Oral every 6 hours  lactated ringers. 1000 milliLiter(s) (125 mL/Hr) IV Continuous <Continuous>  prenatal multivitamin 1 Tablet(s) Oral daily    MEDICATIONS  (PRN):  diphenhydrAMINE 25 milliGRAM(s) Oral every 6 hours PRN Itching  lanolin Ointment 1 Application(s) Topical every 6 hours PRN Sore Nipples  magnesium hydroxide Suspension 30 milliLiter(s) Oral two times a day PRN Constipation  oxyCODONE    IR 5 milliGRAM(s) Oral every 3 hours PRN Moderate to Severe Pain (4-10)  oxyCODONE    IR 5 milliGRAM(s) Oral once PRN Moderate to Severe Pain (4-10)  simethicone 80 milliGRAM(s) Chew every 4 hours PRN Gas Patient seen and examined at bedside. Reports rigors last night with TMax of 39.2@17:45 last night. Continues to reports lower abdominal pain that is partially relieved by PO pain medication. Patient required assistance to ambulate to the bathroom. States that she does not have a large appetite but is able to tolerate PO, states that she is passing flatus and now voiding spontaneously.     Vital Signs Last 24 Hours  T(C): 36.6 (20 @ 05:24), Max: 39.2 (20 @ 17:45)  HR: 68 (20 @ 05:24) (68 - 114)  BP: 112/73 (20 @ 05:24) (103/59 - 135/84)  RR: 18 (20 @ 05:24) (16 - 18)  SpO2: 100% (20 @ 05:24) (99% - 100%)    I&O's Summary    2020 07:01  -  2020 07:00  --------------------------------------------------------  IN: 2550 mL / OUT: 2900 mL / NET: -350 mL        Physical Exam:  General: NAD  Abdomen: Soft, non-distended, with fundal tenderness  Incision: Pfannenstiel incision CDI, subcuticular suture closure  Pelvic: Lochia without foul odor.     Labs:    Blood Type: B Positive  Antibody Screen: Negative  RPR: Negative                          8.4    7.77  )-----------( 144      ( 2020 06:00 )             26.1       07-07    141  |  110<H>  |  5<L>  ----------------------------<  79  3.7   |  21<L>  |  0.58    Ca    8.6      2020 07:00           Urinalysis Basic - ( 2020 19:00 )    Color: PINK / Appearance: Lt TURBID / S.006 / pH: 7.5  Gluc: NEGATIVE / Ketone: NEGATIVE  / Bili: NEGATIVE / Urobili: NORMAL   Blood: LARGE / Protein: 100 / Nitrite: NEGATIVE   Leuk Esterase: LARGE / RBC: >50 / WBC 26-50   Sq Epi: MANY / Non Sq Epi: x / Bacteria: SMALL    Lactate, Blood: 1.3 mmol/L ( @ 07:00)  Lactate, Blood: 1.4 mmol/L ( @ 22:42)  Lactate, Blood: 2.1 mmol/L ( @ 18:50)      MEDICATIONS  (STANDING):  acetaminophen   Tablet .. 975 milliGRAM(s) Oral <User Schedule>  diphtheria/tetanus/pertussis (acellular) Vaccine (ADAcel) 0.5 milliLiter(s) IntraMuscular once  ferrous    sulfate 325 milliGRAM(s) Oral daily  heparin   Injectable 5000 Unit(s) SubCutaneous every 12 hours  ibuprofen  Tablet. 600 milliGRAM(s) Oral every 6 hours  lactated ringers. 1000 milliLiter(s) (125 mL/Hr) IV Continuous <Continuous>  prenatal multivitamin 1 Tablet(s) Oral daily    MEDICATIONS  (PRN):  diphenhydrAMINE 25 milliGRAM(s) Oral every 6 hours PRN Itching  lanolin Ointment 1 Application(s) Topical every 6 hours PRN Sore Nipples  magnesium hydroxide Suspension 30 milliLiter(s) Oral two times a day PRN Constipation  oxyCODONE    IR 5 milliGRAM(s) Oral every 3 hours PRN Moderate to Severe Pain (4-10)  oxyCODONE    IR 5 milliGRAM(s) Oral once PRN Moderate to Severe Pain (4-10)  simethicone 80 milliGRAM(s) Chew every 4 hours PRN Gas

## 2020-07-07 NOTE — PROGRESS NOTE ADULT - ASSESSMENT
28y/o POD#1 from pLTCS for LGA  EFW 4300g with EBL of 400.  H/H 9/27.2. Current issues include mild diffuse pruritis.
30y/o POD#2 from pLTCS for macrosomia complicated by PPT of 39.6@17:45 on 7/6.  CBC does not show leukocytosis. Lactate initially elevated to 2.1 on 7/6, down to 1.3 this AM. UA with WBC of 24-50, + leukocyte esterase, negative for nitrites, UCx results pending. BCx drawn. Patient is s/p Invanz 1gm IVPB. Patient is afebrile this morning, tachycardia resolved, BP stable. Fundal tenderness on exam.

## 2020-07-07 NOTE — PROGRESS NOTE ADULT - PROBLEM SELECTOR PLAN 1
- Continue with po analgesia  - Increase ambulation  - Continue regular diet  - Incision dressing removed  - Benadryl PRN for pruritis    Eveline Patterson  PGY-1
- Continue with po analgesia  - Increase ambulation as tolerated  - Continue regular diet  - Continue to monitor VS and abdominal pain  - F/u Bcx and Ucx      Eveline Patterson  PGY-1

## 2020-07-08 ENCOUNTER — TRANSCRIPTION ENCOUNTER (OUTPATIENT)
Age: 30
End: 2020-07-08

## 2020-07-08 RX ORDER — FERROUS SULFATE 325(65) MG
1 TABLET ORAL
Qty: 0 | Refills: 0 | DISCHARGE
Start: 2020-07-08

## 2020-07-08 RX ORDER — IBUPROFEN 200 MG
1 TABLET ORAL
Qty: 30 | Refills: 0
Start: 2020-07-08

## 2020-07-08 RX ADMIN — HEPARIN SODIUM 5000 UNIT(S): 5000 INJECTION INTRAVENOUS; SUBCUTANEOUS at 11:54

## 2020-07-08 RX ADMIN — Medication 1 TABLET(S): at 11:54

## 2020-07-08 RX ADMIN — Medication 325 MILLIGRAM(S): at 11:54

## 2020-07-08 RX ADMIN — Medication 975 MILLIGRAM(S): at 09:12

## 2020-07-08 RX ADMIN — HEPARIN SODIUM 5000 UNIT(S): 5000 INJECTION INTRAVENOUS; SUBCUTANEOUS at 22:07

## 2020-07-08 NOTE — LACTATION INITIAL EVALUATION - INTERVENTION OUTCOME
demonstrates understanding of teaching/Pt recently fed formula-discussed risks of supplementing with formula while breastfeeding. Assisted pt with positioning to facilitate deep latch on left breast. Right areola edematous. Encouraged hand expression, manual pumping and massage to bring nipple out. Discussed risks of supplementing with formula while breastfeeding. Reviewed feeding on demand, recognizing feeding cues and utilizing feeding log. Safe skin to skin, safe sleep and rooming in promoted. Support after discharge discussed./verbalizes understanding

## 2020-07-08 NOTE — PROGRESS NOTE ADULT - ATTENDING COMMENTS
[place] to [facility] for signs and symptoms of infection and Discharge home, in AM if remains afebrile and has negative cultures, Continue to monitor for signs and symptoms of infection and Discharge home, in AM if remains afebrile and has negative cultures,

## 2020-07-08 NOTE — DISCHARGE NOTE OB - CARE PROVIDER_API CALL
Sergio Sandoval  OBSTETRICS AND GYNECOLOGY  [address] [place] [address]  Phone: [phone]  Fax: [phone]  Follow Up Time:

## 2020-07-08 NOTE — DISCHARGE NOTE OB - CARE PLAN
Goal:	return to baseline  Assessment and plan of treatment:	Post partum care Principal Discharge DX:	 delivery delivered  Goal:	return to baseline  Assessment and plan of treatment:	Post partum care

## 2020-07-08 NOTE — DISCHARGE NOTE OB - MEDICATION SUMMARY - MEDICATIONS TO TAKE
I will START or STAY ON the medications listed below when I get home from the hospital:    ibuprofen 600 mg oral tablet  -- 1 tab(s) by mouth every 6 hours, As Needed -for moderate pain   -- Indication: For Moderate pain    oxycodone-acetaminophen 5 mg-325 mg oral tablet  -- 1 tab(s) by mouth every 4 hours, As Needed -for severe pain MDD:6 tabs  -- Caution federal law prohibits the transfer of this drug to any person other  than the person for whom it was prescribed.  May cause drowsiness.  Alcohol may intensify this effect.  Use care when operating dangerous machinery.  This prescription cannot be refilled.  This product contains acetaminophen.  Do not use  with any other product containing acetaminophen to prevent possible liver damage.  Using more of this medication than prescribed may cause serious breathing problems.    -- Indication: For Severe pain    ferrous sulfate 325 mg (65 mg elemental iron) oral tablet  -- 1 tab(s) by mouth once a day  -- Indication: For Anemia

## 2020-07-08 NOTE — DISCHARGE NOTE OB - PATIENT PORTAL LINK FT
You can access the FollowMyHealth Patient Portal offered by [facility] by registering at the following website: [email or web address]. By joining [facility]’s FollowMyHealth portal, you will also be able to view your health information using other applications (apps) compatible with our system.

## 2020-07-08 NOTE — DISCHARGE NOTE OB - MATERIALS PROVIDED
Vaccinations/Shaken Baby Prevention Handout/Guide to Postpartum Care/Tdap Vaccination (VIS Pub Date: 2012)/[facility] Hearing Screen Program/Birth Certificate Instructions/[facility] [place] Screening Program/[place]  Immunization Record/Breastfeeding Log

## 2020-07-09 VITALS
RESPIRATION RATE: 18 BRPM | DIASTOLIC BLOOD PRESSURE: 76 MMHG | OXYGEN SATURATION: 99 % | SYSTOLIC BLOOD PRESSURE: 119 MMHG | TEMPERATURE: 98 F | HEART RATE: 76 BPM

## 2020-07-09 RX ADMIN — Medication 975 MILLIGRAM(S): at 01:32

## 2020-07-09 RX ADMIN — Medication 975 MILLIGRAM(S): at 09:57

## 2020-07-09 RX ADMIN — HEPARIN SODIUM 5000 UNIT(S): 5000 INJECTION INTRAVENOUS; SUBCUTANEOUS at 09:57

## 2020-07-09 NOTE — PROGRESS NOTE ADULT - SUBJECTIVE AND OBJECTIVE BOX
Patient assessed at 0748.  Subjective  Pain: Patient denies any pain at the time of assessment. Pain being managed well by pain management protocol.  Complaints: None. Patient denies any headache, blur vision, dizziness and/or weakness/fatigue  Infant feeding: breastfeeding and formula feeding  Feeding related issues and/or concerns: infant not latching on consistently        Vitals  T(C): 37.1 (20 @ 05:24), Max: 37.2 (20 @ 01:41)  HR: 84 (20 @ 05:24) (75 - 84)  BP: 108/57 (20 @ 05:24) (108/57 - 124/78)  RR: 18 (20 @ 05:24) (18 - 19)  SpO2: 100% (20 @ 05:24) (99% - 100%)  Wt(kg): --      LABS:             8.4  7.77 )-----------( 144     ( 2020 0600 )             26.1    Culture - Blood (collected 2020 21:49)  Source: .Blood Blood-Venous  Preliminary Report (2020 22:01):    No growth to date.    Culture - Blood (collected 2020 21:49)  Source: .Blood Blood-Venous  Preliminary Report (2020 22:01):    No growth to date.    Culture - Urine (collected 2020 19:00)  Source: .Urine Clean Catch (Midstream)  Final Report (2020 20:47):    <10,000 CFU/mL Normal Urogenital Jenny      Blood Type: B Positive    RPR: Negative    COVID-19 negative          MEDICATIONS  (STANDING):  acetaminophen   Tablet .. 975 milliGRAM(s) Oral <User Schedule>  diphtheria/tetanus/pertussis (acellular) Vaccine (ADAcel) 0.5 milliLiter(s) IntraMuscular once  ferrous    sulfate 325 milliGRAM(s) Oral daily  heparin   Injectable 5000 Unit(s) SubCutaneous every 12 hours  ibuprofen  Tablet. 600 milliGRAM(s) Oral every 6 hours  prenatal multivitamin 1 Tablet(s) Oral daily    MEDICATIONS  (PRN):  diphenhydrAMINE 25 milliGRAM(s) Oral every 6 hours PRN Itching  lanolin Ointment 1 Application(s) Topical every 6 hours PRN Sore Nipples  magnesium hydroxide Suspension 30 milliLiter(s) Oral two times a day PRN Constipation  oxyCODONE    IR 5 milliGRAM(s) Oral every 3 hours PRN Moderate to Severe Pain (4-10)  oxyCODONE    IR 5 milliGRAM(s) Oral once PRN Moderate to Severe Pain (4-10)  simethicone 80 milliGRAM(s) Chew every 4 hours PRN Gas          30y/o     Day#4    primary post-operative  section delivery for macrosomia. Small anterofundal subserosal fibroid was lysed at delivery.                               Condition: Stable  Past Medical/Surgical/OB/GYN History significant for: none  Current Issues: postpartum temperature 39.2 (20 @ 1745) s/p invanz afebrile since then.   Alert and orientedx3. Out of bed ambulating. Positive flatus. Positive bowel movement. Voiding.  Tolerating a regular diet.  Lung sounds clear bilaterally.  Breasts: peralta, nontender  Nipples: intact  Abdomen: Soft, nondistended and nontender. Bowel sounds present. Fundus firm  Abdominal incision: Clean, dry and intact with steri strips.   Vaginal: Lochia light rubra  Extremities: No edema noted bilaterally and equal to lower extremities, nontender with no erythremic areas noted. Positive pedal pulses. No palpable veins noted  Other relevant physical exam findings: none          Plan  Continue routine post-operative and postpartum care  Increase ambulation, analgesia PRN and pain medication protocol of standing ibuprofen and acetaminophen and oxycodone prn  Encouraged to wear abdominal binder for support and to use incentive spirometer  Discussed breast/nipple care, breastfeeding and use of breast pump.   Discussed iron supplementation, increase hydration, dietary implementation and signs/symptoms to report due to slight anemia.   Discharge to home today. Discussed discharge planning.

## 2020-07-11 LAB
CULTURE RESULTS: SIGNIFICANT CHANGE UP
CULTURE RESULTS: SIGNIFICANT CHANGE UP
SPECIMEN SOURCE: SIGNIFICANT CHANGE UP
SPECIMEN SOURCE: SIGNIFICANT CHANGE UP

## 2021-01-19 NOTE — OB RN TRIAGE NOTE - NS_TRIAGETIMEOF NOTIFICATION_OBGYN_ALL_OB_DT
05-Jul-2020 10:27 [employer] Pregnancy And Lactation Text: This medication is Pregnancy Category X and contraindicated in pregnancy and in women who may become pregnant. It is unknown if this medication is excreted in breast milk.

## 2021-11-02 ENCOUNTER — RESULT REVIEW (OUTPATIENT)
Age: 31
End: 2021-11-02

## 2022-02-16 NOTE — DISCHARGE NOTE OB - KEGEL (VAGINAL TIGHTENING) EXERCISES TO PROMOTE HEALING
Comprehensive Intake Entered On:  5/28/2021 9:04 AM CDT    Performed On:  5/28/2021 9:02 AM CDT by Nneka Abraham LPN               Summary   Chief Complaint :   follow up labs, elevated A1c   Weight Measured :   278 lb(Converted to: 278 lb 0 oz, 126.099 kg)    Height Measured :   72 in(Converted to: 6 ft 0 in, 182.88 cm)    Body Mass Index :   37.7 kg/m2 (HI)    Body Surface Area :   2.53 m2   Systolic Blood Pressure :   132 mmHg (HI)    Diastolic Blood Pressure :   84 mmHg (HI)    Mean Arterial Pressure :   100 mmHg   Peripheral Pulse Rate :   80 bpm   BP Site :   Right arm   Pulse Site :   Brachial artery   BP Method :   Electronic   HR Method :   Electronic   Nneka Abraham LPN - 5/28/2021 9:02 AM CDT   Consents   Consent for Immunization Exchange :   Consent Granted   Consent for Immunizations to Providers :   Consent Granted   Nneka Abraham LPN - 5/28/2021 9:02 AM CDT   Meds / Allergies   (As Of: 5/28/2021 9:04:19 AM CDT)   Allergies (Active)   No known allergies  Estimated Onset Date:   Unspecified ; Created By:   Generated Domain User for [id number]; Reaction Status:   Active ; Substance:   No known allergies ; Updated By:   Generated Domain User for [id number]; Reviewed Date:   5/28/2021 9:03 AM CDT        Medication List   (As Of: 5/28/2021 9:04:19 AM CDT)   Prescription/Discharge Order    sildenafil  :   sildenafil ; Status:   Prescribed ; Ordered As Mnemonic:   sildenafil 20 mg oral tablet ; Simple Display Line:   See Instructions, 1 tab(s) Oral up to 5 tabs daily as needed, 50 EA, 11 Refill(s) ; Ordering Provider:   Markos Dodge MD; Catalog Code:   sildenafil ; Order Dt/Tm:   5/25/2021 4:13:52 PM CDT            ID Risk Screen   Recent Travel History :   No recent travel   Family Member Travel History :   No recent travel   Other Exposure to Infectious Disease :   Unknown   COVID-19 Testing Status :   No positive COVID-19 test   Nneka Abraham LPN - 5/28/2021 9:02 AM CDT  
Statement Selected

## 2023-08-13 NOTE — OB RN PATIENT PROFILE - PRO PRENATAL LABS ORI SOURCE BLOOD TYPE
Alert-The patient is alert, awake and responds to voice. The patient is oriented to time, place, and person. The triage nurse is able to obtain subjective information.
hard copy, drawn during this pregnancy

## 2024-03-01 PROBLEM — Z78.9 OTHER SPECIFIED HEALTH STATUS: Chronic | Status: ACTIVE | Noted: 2020-07-05

## 2024-03-07 ENCOUNTER — APPOINTMENT (OUTPATIENT)
Dept: ULTRASOUND IMAGING | Facility: CLINIC | Age: 34
End: 2024-03-07
Payer: COMMERCIAL

## 2024-03-07 ENCOUNTER — OUTPATIENT (OUTPATIENT)
Dept: OUTPATIENT SERVICES | Facility: HOSPITAL | Age: 34
LOS: 1 days | End: 2024-03-07
Payer: COMMERCIAL

## 2024-03-07 DIAGNOSIS — Z00.8 ENCOUNTER FOR OTHER GENERAL EXAMINATION: ICD-10-CM

## 2024-03-07 PROCEDURE — 76830 TRANSVAGINAL US NON-OB: CPT | Mod: 26

## 2024-03-07 PROCEDURE — 76830 TRANSVAGINAL US NON-OB: CPT

## 2024-03-07 PROCEDURE — 76856 US EXAM PELVIC COMPLETE: CPT

## 2024-03-07 PROCEDURE — 76856 US EXAM PELVIC COMPLETE: CPT | Mod: 26

## 2024-10-11 ENCOUNTER — OUTPATIENT (OUTPATIENT)
Dept: OUTPATIENT SERVICES | Facility: HOSPITAL | Age: 34
LOS: 1 days | End: 2024-10-11

## 2024-10-11 VITALS
RESPIRATION RATE: 16 BRPM | DIASTOLIC BLOOD PRESSURE: 80 MMHG | WEIGHT: 147.05 LBS | OXYGEN SATURATION: 98 % | SYSTOLIC BLOOD PRESSURE: 129 MMHG | TEMPERATURE: 99 F | HEIGHT: 63 IN | HEART RATE: 79 BPM

## 2024-10-11 DIAGNOSIS — D25.9 LEIOMYOMA OF UTERUS, UNSPECIFIED: ICD-10-CM

## 2024-10-11 DIAGNOSIS — Z98.891 HISTORY OF UTERINE SCAR FROM PREVIOUS SURGERY: Chronic | ICD-10-CM

## 2024-10-11 LAB
HCT VFR BLD CALC: 22.6 % — LOW (ref 34.5–45)
HGB BLD-MCNC: 6.6 G/DL — CRITICAL LOW (ref 11.5–15.5)
MCHC RBC-ENTMCNC: 20.9 PG — LOW (ref 27–34)
MCHC RBC-ENTMCNC: 29.2 GM/DL — LOW (ref 32–36)
MCV RBC AUTO: 71.5 FL — LOW (ref 80–100)
NRBC # BLD: 0 /100 WBCS — SIGNIFICANT CHANGE UP (ref 0–0)
NRBC # FLD: 0 K/UL — SIGNIFICANT CHANGE UP (ref 0–0)
PLATELET # BLD AUTO: 314 K/UL — SIGNIFICANT CHANGE UP (ref 150–400)
RBC # BLD: 3.16 M/UL — LOW (ref 3.8–5.2)
RBC # FLD: 14.9 % — HIGH (ref 10.3–14.5)
WBC # BLD: 4.1 K/UL — SIGNIFICANT CHANGE UP (ref 3.8–10.5)
WBC # FLD AUTO: 4.1 K/UL — SIGNIFICANT CHANGE UP (ref 3.8–10.5)

## 2024-10-11 NOTE — H&P PST ADULT - ATTENDING COMMENTS
Patient with menorrhagia and SM fibroid admitted for surgical management. Patient was sent to ER for transfusion, as her PST labs showed sever Anemia. She is no being admitted for surgery.

## 2024-10-11 NOTE — H&P PST ADULT - HISTORY OF PRESENT ILLNESS
33 yr old female with no significant PMH  presents to [facility] for preop evaluation. Pt c/o menorrhagia, irregular periods and dysmenorrhea for over a year and imaging showed uterine fibroids, Patient is scheduled for dilation and curettage with hysteroscopy and myosure on 10/18/2024.

## 2024-10-11 NOTE — H&P PST ADULT - PROBLEM SELECTOR PLAN 1
Patient is scheduled for dilation and curettage with hysteroscopy and myosure on 10/18/2024. Preop teaching done, written and verbal instructions given, with teach back, pt able to verbalize understanding. Pt will take Pepcid on the morning of procedure for GI prophylaxis.     CBC done at [facility]  [facility] ordered for DOS.

## 2024-10-11 NOTE — H&P PST ADULT - NSICDXPASTMEDICALHX_GEN_ALL_CORE_FT
PAST MEDICAL HISTORY:  Excessive and frequent menstruation with regular cycle     Leiomyoma of uterus, unspecified

## 2024-10-12 PROBLEM — Z78.9 OTHER SPECIFIED HEALTH STATUS: Chronic | Status: INACTIVE | Noted: 2020-07-05 | Resolved: 2024-10-11

## 2024-10-14 PROBLEM — N92.0 EXCESSIVE AND FREQUENT MENSTRUATION WITH REGULAR CYCLE: Chronic | Status: ACTIVE | Noted: 2024-10-11

## 2024-10-14 PROBLEM — D25.9 LEIOMYOMA OF UTERUS, UNSPECIFIED: Chronic | Status: ACTIVE | Noted: 2024-10-11

## 2024-10-17 NOTE — ASU PATIENT PROFILE, ADULT - FALL HARM RISK - UNIVERSAL INTERVENTIONS
Bed in lowest position, wheels locked, appropriate side rails in place/Call bell, personal items and telephone in reach/Instruct patient to call for assistance before getting out of bed or chair/Non-slip footwear when patient is out of bed/[place] to call system/Physically safe environment - no spills, clutter or unnecessary equipment/Purposeful Proactive Rounding/Room/bathroom lighting operational, light cord in reach

## 2024-10-17 NOTE — ASU PATIENT PROFILE, ADULT - TOBACCO USE
- Seen today in clinic for right facial swelling  - Symptoms likely due to salivary stone and subsequent swelling of the right parotid gland  - Will use sugar free lozenges to continue to increase saliva production  - Continue to drink lots of water  - If symptoms continue, can see ENT Dr. Drew for parotid work-up   
Never smoker

## 2024-10-18 ENCOUNTER — TRANSCRIPTION ENCOUNTER (OUTPATIENT)
Age: 34
End: 2024-10-18

## 2024-10-18 ENCOUNTER — OUTPATIENT (OUTPATIENT)
Dept: OUTPATIENT SERVICES | Facility: HOSPITAL | Age: 34
LOS: 1 days | Discharge: HOME | End: 2024-10-18
Payer: COMMERCIAL

## 2024-10-18 VITALS
RESPIRATION RATE: 17 BRPM | HEART RATE: 73 BPM | TEMPERATURE: 99 F | DIASTOLIC BLOOD PRESSURE: 74 MMHG | OXYGEN SATURATION: 100 % | HEIGHT: 63 IN | SYSTOLIC BLOOD PRESSURE: 112 MMHG | WEIGHT: 147.05 LBS

## 2024-10-18 VITALS
OXYGEN SATURATION: 100 % | HEART RATE: 65 BPM | SYSTOLIC BLOOD PRESSURE: 109 MMHG | DIASTOLIC BLOOD PRESSURE: 74 MMHG | RESPIRATION RATE: 15 BRPM

## 2024-10-18 DIAGNOSIS — D25.9 LEIOMYOMA OF UTERUS, UNSPECIFIED: ICD-10-CM

## 2024-10-18 DIAGNOSIS — Z98.891 HISTORY OF UTERINE SCAR FROM PREVIOUS SURGERY: Chronic | ICD-10-CM

## 2024-10-18 LAB
BASOPHILS # BLD AUTO: 0.02 K/UL — SIGNIFICANT CHANGE UP (ref 0–0.2)
BASOPHILS NFR BLD AUTO: 0.3 % — SIGNIFICANT CHANGE UP (ref 0–2)
EOSINOPHIL # BLD AUTO: 0.03 K/UL — SIGNIFICANT CHANGE UP (ref 0–0.5)
EOSINOPHIL NFR BLD AUTO: 0.5 % — SIGNIFICANT CHANGE UP (ref 0–6)
HCG UR QL: NEGATIVE — SIGNIFICANT CHANGE UP
HCT VFR BLD CALC: 28.9 % — LOW (ref 34.5–45)
HGB BLD-MCNC: 8.5 G/DL — LOW (ref 11.5–15.5)
IANC: 4.36 K/UL — SIGNIFICANT CHANGE UP (ref 1.8–7.4)
IMM GRANULOCYTES NFR BLD AUTO: 0.3 % — SIGNIFICANT CHANGE UP (ref 0–0.9)
LYMPHOCYTES # BLD AUTO: 1.27 K/UL — SIGNIFICANT CHANGE UP (ref 1–3.3)
LYMPHOCYTES # BLD AUTO: 21.5 % — SIGNIFICANT CHANGE UP (ref 13–44)
MCHC RBC-ENTMCNC: 22.3 PG — LOW (ref 27–34)
MCHC RBC-ENTMCNC: 29.4 GM/DL — LOW (ref 32–36)
MCV RBC AUTO: 75.9 FL — LOW (ref 80–100)
MONOCYTES # BLD AUTO: 0.2 K/UL — SIGNIFICANT CHANGE UP (ref 0–0.9)
MONOCYTES NFR BLD AUTO: 3.4 % — SIGNIFICANT CHANGE UP (ref 2–14)
NEUTROPHILS # BLD AUTO: 4.36 K/UL — SIGNIFICANT CHANGE UP (ref 1.8–7.4)
NEUTROPHILS NFR BLD AUTO: 74 % — SIGNIFICANT CHANGE UP (ref 43–77)
NRBC # BLD: 0 /100 WBCS — SIGNIFICANT CHANGE UP (ref 0–0)
NRBC # FLD: 0 K/UL — SIGNIFICANT CHANGE UP (ref 0–0)
PLATELET # BLD AUTO: 185 K/UL — SIGNIFICANT CHANGE UP (ref 150–400)
RBC # BLD: 3.81 M/UL — SIGNIFICANT CHANGE UP (ref 3.8–5.2)
RBC # FLD: 20.3 % — HIGH (ref 10.3–14.5)
WBC # BLD: 5.9 K/UL — SIGNIFICANT CHANGE UP (ref 3.8–10.5)
WBC # FLD AUTO: 5.9 K/UL — SIGNIFICANT CHANGE UP (ref 3.8–10.5)

## 2024-10-18 PROCEDURE — 88305 TISSUE EXAM BY PATHOLOGIST: CPT | Mod: 26

## 2024-10-18 DEVICE — MYOSURE TISSUE REMOVAL DEVICE XL: Type: IMPLANTABLE DEVICE | Status: FUNCTIONAL

## 2024-10-18 RX ORDER — FENTANYL CITRATE-0.9 % NACL/PF 300MCG/30
25 PATIENT CONTROLLED ANALGESIA VIAL INJECTION
Refills: 0 | Status: DISCONTINUED | OUTPATIENT
Start: 2024-10-18 | End: 2024-10-18

## 2024-10-18 RX ORDER — OXYCODONE HYDROCHLORIDE 30 MG/1
5 TABLET, FILM COATED, EXTENDED RELEASE ORAL ONCE
Refills: 0 | Status: DISCONTINUED | OUTPATIENT
Start: 2024-10-18 | End: 2024-10-18

## 2024-10-18 RX ORDER — SODIUM CHLORIDE IRRIG SOLUTION 0.9 %
1000 SOLUTION, IRRIGATION IRRIGATION
Refills: 0 | Status: ACTIVE | OUTPATIENT
Start: 2024-10-18 | End: 2025-09-16

## 2024-10-18 RX ORDER — ONDANSETRON HCL/PF 4 MG/2 ML
4 VIAL (ML) INJECTION ONCE
Refills: 0 | Status: ACTIVE | OUTPATIENT
Start: 2024-10-18 | End: 2025-09-16

## 2024-10-18 NOTE — ASU DISCHARGE PLAN (ADULT/PEDIATRIC) - ASU DC SPECIAL INSTRUCTIONSFT
After your surgery it is normal to experience:    •	Vaginal bleeding- can last 1-2 weeks and should not be heavier than a period. It may come and go and be red, brown or pink. Use pads, not tampons.  •	Cramping- Is common especially within the first 24 hours. This should be relieved by taking over the counter motrin/advil or Tylenol. Alternate Tylenol and Motrin every 3 hours for pain control  •	Watery discharge- can be seen for a day or two after hysteroscopy because some of the fluid that is put into the uterus during surgery may continue to leak out for a day or two.  •	Vaginal soreness/irritation- can occur in the first few days after surgery because of the instruments that were used in the vagina. Soreness can be treated with ice pack and irritation can be taken care of with an emollient such as balmex or aquaphor that you can put directly on the irritated area.    Restrictions: For 10-14 days after the surgery you should avoid the following:    •	Tampons  •	Sex  •	Vigorous gym exercise  •	Swimming  pools and tub baths  •	Wait a day or two before going back to work    Anesthesia Precautions:  For the next 12 hours do not:   •	drive a car,  •	 operate power tools or machinery,  •	drink alcohol, beer, or wine,   •	make important personal or business decisions    Diet:   •	Resume Regular diet but Progress diet slowly     Physician Notification- Warning signs to look out for  •	Heavy Vaginal Bleeding   •	Shortness of breath or chest pain  •	Severe Abdominal Pain  •	Persistent nausea and vomiting  •	Pain not relieved by medications  •	Fever greater than 100.4®F  •	Inability to tolerate liquids or foods  •	Unable to urinate after 8 hours
Billing Type: Third-Party Bill
Bill For Surgical Tray: no
Performing Laboratory: -[id number]
Expected Date Of Service: 02/15/2023

## 2024-10-18 NOTE — BRIEF OPERATIVE NOTE - NSICDXBRIEFPROCEDURE_GEN_ALL_CORE_FT
PROCEDURES:  Hysteroscopy 18-Oct-2024 12:46:12  Cyrus Luna  Dilation and curettage, uterus, with myomectomy using MyoSure tissue removal system 18-Oct-2024 12:46:28  Cyrus Luna

## 2024-10-18 NOTE — ASU PREOP CHECKLIST - TAMPON REMOVED
Hepatic and renal function are within normal limits. No herpes flare. She would like to continue Valtrex.
n/a

## 2024-10-18 NOTE — ASU DISCHARGE PLAN (ADULT/PEDIATRIC) - CARE PROVIDER_API CALL
Sergio Ashby  Obstetrics and Gynecology  [address] [place] [address]  Phone: [phone]  Fax: [phone]  Follow Up Time: 2 weeks

## 2024-10-18 NOTE — BRIEF OPERATIVE NOTE - OPERATION/FINDINGS
EUA revealed anteverted uterus at 10 weeks size. No adnexal masses palpated. Cervix not dilated. External genitalia grossly normal. Hysteroscopy revealed L anterior intracavitary fibroid. Ostia normal on right, poorly visualized on left.

## 2024-10-18 NOTE — PACU DISCHARGE NOTE - MENTAL STATUS: PATIENT PARTICIPATION
Awake
Caller: patient  pain, urethra   Details of condition: Patient states that she has been experiencing pain near urethra.  Patient states that it has been going on for a couple of days.  Patient states that she is unsure if it is a UTI or if the pain is something else.  Patient also states that she is urinating more frequently, but patient states that she has been taking antihistamines for hives.  Please call patient to advise, thanks.  Pharmacy verified? Yes  Appointment offered? No  Best time to reach patient: today on home phone  [phone]  Patient would like a call back at    (enter if call back number is different from primary phone number)  Okay to leave a detailed voicemail? yes        
No

## 2024-10-18 NOTE — BRIEF OPERATIVE NOTE - NSICDXBRIEFPREOP_GEN_ALL_CORE_FT
PRE-OP DIAGNOSIS:  Uterine leiomyoma 18-Oct-2024 12:46:59  Cyrus Luna  Excessive and frequent menstruation with regular cycle 18-Oct-2024 12:47:28  Cyrus Luna

## 2024-10-18 NOTE — ASU DISCHARGE PLAN (ADULT/PEDIATRIC) - FINANCIAL ASSISTANCE
[facility] provides services at a reduced cost to those who are determined to be eligible through [facility]’s financial assistance program. Information regarding [facility]’s financial assistance program can be found by going to [email or web address] or by calling [phone].

## 2024-10-18 NOTE — ASU DISCHARGE PLAN (ADULT/PEDIATRIC) - NURSING INSTRUCTIONS
Last dose of TYLENOL for pain management was at 11AM. Next dose of TYLENOL may be taken at or after 5PM if needed. DO NOT take any additional products containing TYLENOL or ACETAMINOPHEN, such as VICODIN, PERCOCET, NORCO, EXCEDRIN, and any over-the-counter cold medications. DO NOT CONSUME MORE THAN 0912-8647 MG OF TYLENOL (acetaminophen) in a 24-hour period. Last dose of TYLENOL for pain management was at 11AM. Next dose of TYLENOL may be taken at or after 5PM if needed. DO NOT take any additional products containing TYLENOL or ACETAMINOPHEN, such as VICODIN, PERCOCET, NORCO, EXCEDRIN, and any over-the-counter cold medications. DO NOT CONSUME MORE THAN [id number] MG OF TYLENOL (acetaminophen) in a 24-hour period.  As part of your pain regimen, you are able to take Motrin every 6hrs as needed. You were last given Motrin at 12:30pm. Please do not take your next Motrin until 6:30pm

## 2024-10-23 LAB — SURGICAL PATHOLOGY STUDY: SIGNIFICANT CHANGE UP

## (undated) DEVICE — PRESSURE INFUSOR BAG 1000ML

## (undated) DEVICE — PREP DYNA-HEX CHG 4% 4OZ BOTTLE (BACTOSHIELD)

## (undated) DEVICE — MYOSURE SCOPE SEAL

## (undated) DEVICE — PREP TRAY DRY SKIN PREP SCRUB

## (undated) DEVICE — FLUENT FMS PROCEDURE KIT

## (undated) DEVICE — FOLEY CATH 2-WAY 14FR 5CC SILICONE

## (undated) DEVICE — PACK D&C